# Patient Record
Sex: MALE | Race: WHITE
[De-identification: names, ages, dates, MRNs, and addresses within clinical notes are randomized per-mention and may not be internally consistent; named-entity substitution may affect disease eponyms.]

---

## 2020-07-09 ENCOUNTER — HOSPITAL ENCOUNTER (EMERGENCY)
Dept: HOSPITAL 47 - EC | Age: 50
Discharge: HOME | End: 2020-07-09
Payer: COMMERCIAL

## 2020-07-09 DIAGNOSIS — Z23: ICD-10-CM

## 2020-07-09 DIAGNOSIS — S01.01XA: Primary | ICD-10-CM
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
## 2020-07-09 PROCEDURE — 12002 RPR S/N/AX/GEN/TRNK2.6-7.5CM: CPT

## 2020-07-09 PROCEDURE — 90471 IMMUNIZATION ADMIN: CPT

## 2020-07-09 PROCEDURE — 90715 TDAP VACCINE 7 YRS/> IM: CPT

## 2020-07-09 PROCEDURE — 99283 EMERGENCY DEPT VISIT LOW MDM: CPT

## 2020-07-09 NOTE — ED
General Adult HPI





- General


Chief complaint: Head Injury


Stated complaint: Head injury, [facility]


Time Seen by Provider: 07/09/20 15:23


Source: patient, RN notes reviewed


Mode of arrival: ambulatory


Limitations: no limitations





- History of Present Illness


Initial comments: 





49-year-old male presents to the emergency department for a chief complaint of 

laceration.  Patient states he was at work bending over when the backhoe bucket 

grazed his scalp.  Patient states that it caused a laceration.  However he 

denies any significant impact to his head.  States it more grazed his scalp.  

Denies blood thinners.  Denies headache.  Denies loss of consciousness.  Patient

is not up-to-date on tetanus.Patient has no other complaints at this time 

including shortness of breath, chest pain, abdominal pain, nausea or vomiting, 

headache, or visual changes.





- Related Data


                                    Allergies











Allergy/AdvReac Type Severity Reaction Status Date / Time


 


No Known Allergies Allergy   Verified 07/09/20 15:21














Review of Systems


ROS Statement: 


Those systems with pertinent positive or pertinent negative responses have been 

documented in the HPI.





ROS Other: All systems not noted in ROS Statement are negative.





Past Medical History


Past Medical History: Hypertension


History of Any Multi-Drug Resistant Organisms: None Reported


Past Surgical History: No Surgical Hx Reported


Past Psychological History: No Psychological Hx Reported


Smoking Status: Never smoker


Past Alcohol Use History: None Reported


Past Drug Use History: None Reported





General Exam


Limitations: no limitations


General appearance: alert, in no apparent distress


Head exam: Present: normocephalic.  Absent: atraumatic (Patient has a 4 cm 

laceration noted to the frontal scalp within the hair.  Bleeding controlled at 

this time.)


Eye exam: Present: normal appearance, PERRL, EOMI.  Absent: scleral icterus, 

conjunctival injection, periorbital swelling


ENT exam: Present: normal exam, mucous membranes moist


Neck exam: Present: normal inspection, full ROM.  Absent: tenderness, 

meningismus, lymphadenopathy


Respiratory exam: Present: normal lung sounds bilaterally.  Absent: respiratory 

distress, wheezes, rales, rhonchi, stridor


Cardiovascular Exam: Present: regular rate, normal rhythm, normal heart sounds. 

Absent: systolic murmur, diastolic murmur, rubs, gallop, clicks


Neurological exam: Present: alert, oriented X3, normal gait, other (GCS 15)





Course


                                   Vital Signs











  07/09/20





  15:17


 


Temperature 98.7 F


 


Pulse Rate 78


 


Respiratory 18





Rate 


 


Blood Pressure 155/78


 


O2 Sat by Pulse 97





Oximetry 














Procedures





- Laceration


  ** Laceration #1


Consent Obtained: verbal consent


Indication: laceration


Site: scalp


Size (cm): 4


Description: linear


Depth: simple, single layer


Pre-repair: wound explored, irrigated extensively (With saline pressure 

irrigation)


Type of Sutures: other (staples)


Size of Sutures: other (4)


Technique: simple, interrupted


Patient Tolerated Procedure: well, no complications





Medical Decision Making





- Medical Decision Making





No blunt force trauma to the head.  Backhoe bucket grazed patient scalp causing 

a laceration.  No loss of consciousness, no headache.  Wound was irrigated and 

stapled with 4 staples.  Patient will follow up with primary care.  Discussed 

return precautions for infection and head injury.  Discussed returning for any 

other worsening symptoms.





Disposition


Clinical Impression: 


 Laceration





Disposition: HOME SELF-CARE


Condition: Good


Instructions (If sedation given, give patient instructions):  Laceration (ED), 

Staple Care (ED)


Additional Instructions: 


Please monitor for any signs of infection such as spreading or streaking 

redness, drainage, or fevers.  Monitor for any signs of concussion such as 

headache, visual changes, nausea, confusion.  If these occur return immediately 

to the emergency department.  Otherwise return in 7-10 days for staple removal. 

Follow up with primary care in 1-2 days for a recheck.


Is patient prescribed a controlled substance at d/c from ED?: No


Referrals: 


Nonstaff,Physician [Primary Care Provider] - 1-2 days


Time of Disposition: 15:35

## 2020-07-10 VITALS
TEMPERATURE: 98.7 F | SYSTOLIC BLOOD PRESSURE: 155 MMHG | RESPIRATION RATE: 18 BRPM | DIASTOLIC BLOOD PRESSURE: 78 MMHG | HEART RATE: 78 BPM